# Patient Record
Sex: FEMALE | Race: WHITE
[De-identification: names, ages, dates, MRNs, and addresses within clinical notes are randomized per-mention and may not be internally consistent; named-entity substitution may affect disease eponyms.]

---

## 2018-08-09 ENCOUNTER — HOSPITAL ENCOUNTER (EMERGENCY)
Dept: HOSPITAL 25 - UCCORT | Age: 58
Discharge: HOME | End: 2018-08-09
Payer: COMMERCIAL

## 2018-08-09 VITALS — SYSTOLIC BLOOD PRESSURE: 141 MMHG | DIASTOLIC BLOOD PRESSURE: 78 MMHG

## 2018-08-09 DIAGNOSIS — R51: ICD-10-CM

## 2018-08-09 DIAGNOSIS — H92.01: Primary | ICD-10-CM

## 2018-08-09 DIAGNOSIS — N18.3: ICD-10-CM

## 2018-08-09 DIAGNOSIS — J02.9: ICD-10-CM

## 2018-08-09 PROCEDURE — 99211 OFF/OP EST MAY X REQ PHY/QHP: CPT

## 2018-08-09 PROCEDURE — G0463 HOSPITAL OUTPT CLINIC VISIT: HCPCS

## 2018-08-09 NOTE — XMS REPORT
Desi Cai

 Created on:2018



Patient:Desi Cai

Sex:Female

:1960

External Reference #:[id number]





Demographics







 Address  [address] 



   [place] [address]

 

 Home Phone  [phone]

 

 Mobile Phone  [phone]

 

 Email Address  [email or web address]

 

 Preferred Language  English

 

 Marital Status  Not  Or 

 

 Christian Affiliation  Unknown

 

 Race  White

 

 Ethnic Group  Not  Or 









Author







 Organization  [email or web address]

 

 Address  [address]



   [place] [address]

 

 Phone  [phone]









Support







 Name  Relationship  Address  Phone

 

 Paulino Cai    [phone]   [phone]



     [place] [address]  









Care Team Providers







 Name  Role  Phone

 

 Stacey Charles MD  Care Team Information   Unavailable









Payers







 Type  Date  Identification Numbers  Payment Provider  Subscriber

 

 Health Maintenance  Effective:  Policy Number:  BCBS Ppo  Desi Cai



 [facility]  2018  [id number]    









 PayID: [id number]  [address] [id number]









 [place] [address]







Problems







 Date  Description  Provider  Status

 

 Onset: 2017  Impaired fasting glycaemia  Stacey Charles MD  Active

 

 Onset: 2017  Irritable bowel syndrome  Stacey Charles MD  Active

 

 Onset: 2017  Allergic rhinitis  Stacey Charles MD  Active

 

 Onset: 2017  Mild intermittent asthma  Stacey Charles MD  Active

 

 Onset: 2018  Chronic kidney disease stage 3  Stacey Charles MD  Active

 

 Onset: 2017  Essential hypertension  Stacey Charles MD  Resolved









 Resolved: 2018







Family History







 Date  Family Member(s)  Problem(s)  Comments

 

   Father   due to Cancer,  () - smoker 49



     Lung  

 

 :  (age 92  Mother   due to Natural  



 Years)    Causes  

 

   Children  3  

 

   Siblings  3  

 

   First Brother  1/2 Brother. Valve  



     Replacement. Age 62  

 

   First Sister   due to Cancer,  () - 60



     Kidney  

 

   Second Sister  CAD   (2011) stent



       /upstate age ??55







Social History







 Type  Date  Description  Comments

 

 Marital Status      

 

 Lives With    Spouse  

 

 Occupation    Retired  

 

 ETOH Use    Rarely consumes alcohol  

 

 Smoking    Patient has never smoked  

 

 Exercise Type/Frequency    Exercises regularly  Does housework daily and walks



       5 x/week







Allergies, Adverse Reactions, Alerts







 Date  Description  Reaction  Status  Severity  Comments

 

 2015  NKDA    active    







Medications







 Medication  Date  Status  Form  Strength  Qnty  SIG  Indications  Ordering



                 Provider

 

 Ramipril  /  Active  Capsules  2.5mg  30caps  1 by  N18.3  Jamal2018          mouth    MD Stacey



             every    



             day    

 

 Ventolin HFA  /  Active  Aerosol  108(90Base  1units  2 puffs    Jamal2018      ) mcg/Act    every 4    MD Stacey



             hours as    



             needed    

 

 Flovent HFA  /  Active  Aerosol  44mcg/Act  31.8gm  1 puff  J45.30  
Jamal



             twice a    MD Stacey



             day    

 

 Levocetirizine  /  Active  Tablets  5mg    take 1  J30.9  Loida Charles            tablet    MD Stacey



             by mouth    



             every    



             evening    



             -    



             over-the    



             -counter    

 

 Dicyclomine HCL  /  Active  Tablets  20mg  60tabs  1 by    Jamal



             mouth    MD Stacey



             tid a    



             day as    



             needed    

 

 Loratadine  /  Active  Tablets  10mg  OTC  1 by    Jamal,



             georgi Ibarra MD



             every    



             day prn    

 

 Multivitamins  /  Active  Capsules    OTC  1 by    Jamal



             georgi Ibarra MD



             every    



             day    

 

                 

 

 Amoxicillin/Clavu  /  Hx  Tablets  875-125mg  20tabs  1 by  R10.32  
Jamal



 lanate Potassium   -          georgi Ibarra MD



   /          twice a    



             day x 10    



             days    

 

 Omeprazole  /  Hx  Capsules  40mg  90caps  1 by  R10.12  Bobvann



    -          mouth 2x    a,



   /          every    Dang,



             day for    NP



             1 week    



             then 1x    



             every    



             day    

 

 Escitalopram  /  Hx  Tablets  10mg  30tabs  1 by  F41.9  Jamal



 Oxalate   -          georgi Ibarra MD



   /          every    



             day    

 

 Metoprolol  /  Hx  Tablets  25mg  60tabs  1 by    Jamal



 Tartrate   -          georgi Ibarra MD



   /          twice a    



             day    

 

 Escitalopram  /  Hx  Tablets  10mg  30tabs  1 by  F41.9  Jamal



 Oxalate   -          mouth    MD Stacey



   /          every    



             day    

 

 Zyrtec Allergy  /  Hx  Capsules  10mg  30caps  1 by    Jamal



    -          mouth    MD Stacey



   10/30/          every    



             day    

 

 Flovent HFA  /  Hx  Aerosol  110mcg/Act  12gm  1 puff  J45.30  Jamal



    -          every    MD Stacey



   /          night at    



   2018          bedtime    

 

 Zinc 15  /  Hx  Tablets  66mg    1 by    Jamal



    -          mouth    MD Stacey



   /          every    



             day    

 

 Ventolin HFA  /  Hx  Aerosol  108mcg/Act  1units  2 puffs    Jamal



    -          every     MD Stacey



   /          hours as    



   2018          needed    

 

 Flovent HFA  /  Hx  Aerosol  220mcg/Act  1units  inhale 1  493.90  Jamal



    -          puff by    MD Stacey



   /          mouth at    



             bedtime    

 

 Aspirin Low Dose  /  Hx  Tablets  81mg    1 po qd    Jamal,



    -              MD Stacey



   2017              

 

 Metoprolol  /  Hx    25mg    1 by    Unknown



   0000 -          mouth    



   /          twice a    



             day    







Immunizations







 CPT Code  Status  Date  Vaccine  Reaction  Lot #

 

   Given  2017  Flu Vaccine NOS    

 

   Given  10/05/2016  Fluvirin Immunization  [facility]  

 

   Given  10/09/2015  Fluvirin Immunization  [facility]  

 

 [id number]  Given  10/07/2013  Afluria Or Fluvirin Flu Vac Intramuscular    

 

 74444  Given  10/05/2013  Afluria Or Fluvirin Flu Vac Intramuscular    

 

 [id number]  Given  09/10/2009  Afluria Or Fluvirin Flu Vac Intramuscular    







Vital Signs







 Date  Vital  Result  Comment

 

 2018  Weight  148.00 lb  









 Heart Rate  76 /min  

 

 BP Systolic  122 mmHg  

 

 BP Diastolic  70 mmHg  

 

 Respiratory Rate  18 /min  

 

 Height  64 inches  5'4" 18

 

 BMI (Body Mass Index)  25.4 kg/m2  









 2018  Weight  153.00 lb  









 Heart Rate  76 /min  

 

 BP Systolic  128 mmHg  

 

 BP Diastolic  80 mmHg  

 

 Respiratory Rate  18 /min  

 

 Height  64 inches  5'4" 18

 

 BMI (Body Mass Index)  26.3 kg/m2  









 2017  Weight  148.00 lb  









 Heart Rate  84 /min  

 

 BP Systolic  128 mmHg  

 

 BP Diastolic  86 mmHg  

 

 Respiratory Rate  16 /min  

 

 Height  64 inches  5'4"

 

 BMI (Body Mass Index)  25.4 kg/m2  









 2017  Weight  140.00 lb  









 Heart Rate  124 /min  

 

 BP Systolic  150 mmHg  

 

 BP Diastolic  90 mmHg  

 

 Respiratory Rate  16 /min  

 

 Height  64 inches  5'4" 17

 

 BMI (Body Mass Index)  24.0 kg/m2  









 2017  Weight  142.00 lb  









 Heart Rate  104 /min  

 

 BP Systolic  130 mmHg  

 

 BP Diastolic  88 mmHg  

 

 Respiratory Rate  18 /min  

 

 Height  64 inches  5'4" 17

 

 BMI (Body Mass Index)  24.4 kg/m2  









 2017  Weight  139.00 lb  









 Heart Rate  76 /min  

 

 BP Systolic  150 mmHg  

 

 BP Diastolic  90 mmHg  

 

 Respiratory Rate  18 /min  

 

 Height  64 inches  5'4" 17

 

 BMI (Body Mass Index)  23.9 kg/m2  









 2017  Body Temperature  97.8 F  









 Weight  142.00 lb  

 

 Heart Rate  78 /min  

 

 BP Systolic  150 mmHg  

 

 BP Diastolic  80 mmHg  

 

 BP Systolic Recheck  132 mmHg  

 

 BP Diastolic Recheck  82 mmHg  

 

 Respiratory Rate  18 /min  

 

 Height  64 inches  5'4" 17

 

 BMI (Body Mass Index)  24.4 kg/m2  









 2017  Weight  141.00 lb  









 Heart Rate  78 /min  

 

 BP Systolic  122 mmHg  

 

 BP Diastolic  70 mmHg  

 

 Respiratory Rate  13 /min  

 

 Height  64 inches  5'4" 17

 

 BMI (Body Mass Index)  24.2 kg/m2  









 2016  Weight  154.00 lb  









 Heart Rate  94 /min  

 

 BP Systolic  138 mmHg  L/Reg

 

 BP Diastolic  86 mmHg  L/Reg

 

 Respiratory Rate  17 /min  

 

 Height  64 inches  5'4" 

 

 BMI (Body Mass Index)  26.4 kg/m2  









 2015  Weight  146.00 lb  









 Heart Rate  72 /min  

 

 BP Systolic  120 mmHg  

 

 BP Diastolic  80 mmHg  

 

 Respiratory Rate  18 /min  

 

 Height  64.5 inches  5'4.50"

 

 BMI (Body Mass Index)  24.7 kg/m2  









 2014  Weight  148.00 lb  









 Heart Rate  68 /min  

 

 BP Systolic  130 mmHg  

 

 BP Diastolic  80 mmHg  

 

 Respiratory Rate  18 /min  

 

 Height  64.5 inches  5'4.50"







Results







 Test  Date  Test  Result  H/L  Range  Note

 

 Laboratory test  2018  Pap Smear Thin Prep  SEE NOTE      1



 finding            

 

 Laboratory test  2018  [facility]      2



 finding      <SEE NOTE>      

 

 Lipid  2018  Cholesterol  208 mg/dL  High    3









 Triglycerides  97 mg/dL      3

 

 HDL  68 mg/dL    35-85  3, 4

 

 Chol/ HDL Ratio  3.0 ratio  Low  3.7-5.6  3

 

 VLDL  19 mg/dL    2-29  3

 

 LDL (Calc)  120 mg/dL  High  20-99  3, 5









 Laboratory test finding  2018  TSH  1.64 uIU/mL    0.35-4.94  3

 

 CBC With Auto Diff  2018  WBC  6.8 K/uL    4.1-11.0  3









 RBC  4.73 M/uL    4.00-5.40  3

 

 Hemoglobin  14.0 gm/dL    12.0-16.0  3

 

 Hematocrit  42.0 %    36.0-47.0  3

 

 MCV  88.8 fL    80.0-97.0  3

 

 MCH  29.6 pg    27.0-32.0  3

 

 MCHC  33.4 g/dL    32.0-36.0  3

 

 RDW  13.3 %    11.5-14.5  3

 

 PLT Count  280 K/ul    140-400  3

 

 MPV  7.4 FL    7.1-10.7  3

 

 Neutrophil  44.4 %    35.0-75.0  3

 

 Lymphocyte  35.3 %    16.0-52.0  3

 

 Monocyte  9.4 %    2.0-10.0  3

 

 Eosinophil  9.9 %  High  0.0-5.0  3

 

 Basophil  1.0 %    0.0-4.0  3

 

 Abs Neutrophils  3.0 K/uL    2.1-8.0  3

 

 Abs Lymphocytes  2.4 K/uL    0.8-5.5  3

 

 Abs Monocytes  0.6 K/uL    0.1-1.0  3

 

 Abs Eosinophils  0.7 K/uL  High  0.0-0.5  3

 

 Abs Basophils  0.1 K/uL    0.0-0.3  3









 Comprehensive Met Panel-FCM  2018  Sodium  142 mmol/L    135-146  3, 6









 Potassium  4.4 mmol/L    3.5-5.2  3

 

 Chloride#  106 mmol/L      3, 7

 

 Carbon Dioxide  28 mmol/L    24-34  3

 

 Glucose  110 mg/dL  High    3

 

 BUN  20 mg/dL    6-26  3

 

 Creatinine  1.0 mg/dL    0.5-1.4  3

 

 Calcium  9.3 mg/dL    8.5-10.2  3

 

 Total Protein  6.7 g/dL    6.0-8.0  3

 

 Albumin  4.1 g/dL    3.6-4.9  3

 

 Globulin  2.6 g/dL    2.0-3.5  3

 

 A/G Ratio  1.6 Ratio    1.0-2.2  3

 

 Total Bilirubin  0.6 mg/dL    0.1-1.3  3

 

 Alkaline Phosphatase  71 U/L      3

 

 Alt  27 U/L    3-42  3

 

 Ast  23 U/L    8-42  3

 

  Rowena Egfr  >60    >60  3, 8

 

 Non  Rowena Egfr  56  Low  >60  3, 9

 

 Anion Gap  8 mmol/L    5-15  3, 10









 Hemoglobin A1c  2018  Hemoglobin A1c  5.7 %    4.1-5.9  3









 Estimated Average Glucose Calc  117 mg/dL      3









 CBC With Auto Diff  2017  WBC  6.1 K/uL    4.1-11.0  11









 RBC  4.55 M/uL    4.00-5.40  11

 

 Hemoglobin  13.6 gm/dL    12.0-16.0  11

 

 Hematocrit  40.7 %    36.0-47.0  11

 

 MCV  89.5 fL    80.0-97.0  11

 

 MCH  29.8 pg    27.0-32.0  11

 

 MCHC  33.3 g/dL    32.0-36.0  11

 

 RDW  13.7 %    11.5-14.5  11

 

 PLT Count  256 K/ul    140-400  11

 

 Neutrophil  59.2 %    35.0-75.0  11

 

 Lymphocyte  26.0 %    16.0-52.0  11

 

 Monocyte  8.8 %    2.0-10.0  11

 

 Eosinophil  5.1 %  High  0.0-5.0  11

 

 Basophil  0.9 %    0.0-4.0  11

 

 Abs Neutrophils  3.6 K/uL    2.1-8.0  11

 

 Abs Lymphocytes  1.6 K/uL    0.8-5.5  11

 

 Abs Monocytes  0.5 K/uL    0.1-1.0  11

 

 Abs Eosinophils  0.3 K/uL    0.0-0.5  11

 

 Abs Basophils  0.1 K/uL    0.0-0.3  11









 Laboratory test finding  2017  TSH  0.80 uIU/mL    0.35-4.94  11

 

 Hemoglobin A1c  2017  Hemoglobin A1c  5.7 %    4.1-5.9  11









 Estimated Average Glucose Calc  117      11









 Basic (BMP)  2017  Sodium  143 mmol/L    135-146  11, 12









 Potassium  4.0 mmol/L    3.5-5.2  11

 

 Chloride#  106 mmol/L      11, 13

 

 Carbon Dioxide  28 mmol/L    24-34  11

 

 Glucose  100 mg/dL      11

 

 BUN  18 mg/dL    6-26  11

 

 Creatinine  0.9 mg/dL    0.5-1.4  11

 

 Calcium  9.6 mg/dL    8.5-10.2  11

 

 Non  Rowena Egfr  >60    >60  11, 14

 

  Rowena Egfr  >60    >60  11, 15

 

 Anion Gap  13 mmol/L    7-16  11, 16









 Urinalysis With Microscopic  2017  Urine Color  YELLOW    Yellow  17









 Urine Clarity  CLEAR    Clear  17

 

 Urine Glucose - Dipstick  NEGATIVE mg/dL    Negative  17

 

 Urine Bilirubin - Dipstick  NEGATIVE    Negative  17

 

 Urine Ketone  15 mg/dL  High  Negative  17

 

 Urine Specific Gravity  1.015    1.010-1.030  17

 

 Urine Blood  MODERATE    Negative  17

 

 Urine PH  5.0  Low  6.5-7.5  17

 

 Urine Protein - Dipstick  NEGATIVE mg/dL    Negative  17

 

 Urine Urobilinogen - Dipstick  0.2 E.U./dL    0.2-1.0  17

 

 Urine Nitrite - Dipstick  NEGATIVE    Negative  17

 

 Urine Leuk Esterase  NEGATIVE    Negative  17

 

 Urine RBC  0-2 rbc/hpf    0-2  17

 

 Urine WBC  0-2 wbc/hpf    0-7  17

 

 Urine Epithelial Cells  VERY FEW /lpf    None Seen  17

 

 Source:  URINE, CLEAN CAT <SEE NOTE>      17, 18









 Comprehensive Metabolic Panel  2017  Glucose  103 mg/dL      17









 BUN  18 mg/dL    7-18  17

 

 Creatinine  1.1 mg/dL    0.6-1.3  17

 

 Glom Filtration Rate, Estimate  54 mL/min    >60  17

 

 If   >60 mL/min    >60  17, 19

 

 BUN/Creat  16.3 ratio      17

 

 Sodium  139 mmol/L    136-145  17

 

 Potassium  3.7 mmol/L    3.5-5.1  17

 

 Chloride  103 mmol/L      17

 

 Carbon Dioxide  25 mmol/L    21-32  17

 

 Anion Gap  11 mEq/L    8-16  17

 

 Calcium  9.8 mg/dL    8.5-10.1  17

 

 Total Protein  8.3 g/dL  High  6.4-8.2  17

 

 Albumin  4.3 g/dL    3.4-5.0  17

 

 Globulin  4.0 g/dL    1.9-4.3  17

 

 Alb/Glob  1.1 ratio      17

 

 Bilirubin,Total  0.5 mg/dL    0.2-1.0  17

 

 Sgot/Ast  18 U/L    15-37  17

 

 SGPT/Alt  30 U/L    12-78  17

 

 Alkaline Phosphatase  84 U/L      17









 Laboratory test finding  2017  Lipase  318 U/L      17

 

 CBS W/Automated Diff  2017  White Blood Count  11.4 K/uL  High  3.1-10.7
  17









 Red Blood Count  4.96 M/uL    3.90-5.40  17

 

 Hemoglobin  14.9 gm/dL    11.6-15.8  17

 

 Hematocrit  44.1 %    36.0-46.1  17

 

 Mean Cell Volume  88.9 fl    80.9-99.0  17

 

 Mean Corpuscular HGB  30.0 pg    25.9-32.7  17

 

 Mean Corpuscular HGB Conc  33.8 g/dL    30.8-34.3  17

 

 Platelet Count  272 K/uL    150-400  17

 

 Red Cell Distri Width SD  43.0 fl    3-47  17

 

 Red Cell Distri Width %CV  13.6 %    11.7-14.4  17

 

 Mean Platelet Volume  9.3 fL    8.9-12.4  17

 

 Neut%  63.6 %    40.4-72.8  17

 

 Lymph %  26.5 %    20.0-42.0  17

 

 Mono %  7.4 %    4.3-13.2  17

 

 Eo%  2.2 %    0.0-6.6  17

 

 Bas%  0.3 %    0.0-1.1  17

 

 Neut#  7.26 K/uL  High  1.8-7.0  17

 

 Lymph #  3.03 K/uL    1.0-4.0  17

 

 Mono #  0.85 K/uL    0.3-0.9  17

 

 Eos #  0.25 K/uL    0.0-0.5  17

 

 Baso #  0.04 K/uL    0.0-0.1  17









 Slide Review  2017  Slide Review  .      17, 20

 

 CBC With Auto Diff  2017  WBC  9.1 K/uL    4.1-11.0  21









 RBC  4.94 M/uL    4.00-5.40  21

 

 Hemoglobin  14.6 gm/dL    12.0-16.0  21

 

 Hematocrit  44.0 %    36.0-47.0  21

 

 MCV  89.1 fL    80.0-97.0  21

 

 MCH  29.6 pg    27.0-32.0  21

 

 MCHC  33.2 g/dL    32.0-36.0  21

 

 RDW  13.3 %    11.5-14.5  21

 

 PLT Count  289 K/ul    140-400  21

 

 Neutrophil  73.8 %    35.0-75.0  21

 

 Lymphocyte  16.7 %    16.0-52.0  21

 

 Monocyte  7.0 %    2.0-10.0  21

 

 Eosinophil  1.9 %    0.0-5.0  21

 

 Basophil  0.6 %    0.0-4.0  21

 

 Abs Neutrophils  6.8 K/uL    2.1-8.0  21

 

 Abs Lymphocytes  1.5 K/uL    0.8-5.5  21

 

 Abs Monocytes  0.6 K/uL    0.1-1.0  21

 

 Abs Eosinophils  0.2 K/uL    0.0-0.5  21

 

 Abs Basophils  0.1 K/uL    0.0-0.3  21









 Laboratory test finding  2017  Esr  19 mm/hr    0-20  21

 

 Hepatic Panel (LFT)  2017  Total Protein  7.4 g/dL    6.0-8.0  21









 Albumin  4.7 g/dL    3.6-4.9  21

 

 Total Bilirubin  0.9 mg/dL    0.1-1.3  21

 

 Direct Bilirubin  0.1 mg/dL    0.0-0.4  21

 

 Alkaline Phosphatase  75 U/L      21

 

 Alt  23 U/L    3-42  21

 

 Ast  21 U/L    8-42  21









 Laboratory test finding  2017  Lipase  34 U/L    11-82  21

 

 Basic (BMP)  2017  Sodium  140 mmol/L    135-146  21, 22









 Potassium  4.0 mmol/L    3.5-5.2  21

 

 Chloride#  103 mmol/L      21, 23

 

 Carbon Dioxide  26 mmol/L    24-34  21

 

 Glucose  133 mg/dL  High    21

 

 BUN  15 mg/dL    6-26  21

 

 Creatinine  1.0 mg/dL    0.5-1.4  21

 

 Calcium  9.9 mg/dL    8.5-10.2  21

 

 Non  Rowena Egfr  57  Low  >60  21, 24

 

  Rowena Egfr  >60    >60  21, 25

 

 Anion Gap  15 mmol/L    7-16  21, 26









 Laboratory test finding  2017  TSH  0.69 uIU/mL    0.35-4.94  21

 

 Rout Urine W/ Micro -RL  2017  Color  YELLOW      27









 Appearance  CLEAR      27

 

 Spec Grav Urine  1.030    (1.003-1.030)  27

 

 PH Urine  6.0    (5.0-7.5)  27

 

 Leuk Esterase  NEGATIVE    (Neg)  27

 

 Nitrite Urine  NEGATIVE    (Neg)  27

 

 Protein Urine  NEGATIVE    (Neg)  27

 

 Glucose Urine  NEGATIVE    (Neg)  27

 

 Ketone Urine  NEGATIVE    (Neg)  27

 

 Urobilinogen  0.2 mg/dL    (0-1.0)  27

 

 Bilirubin Urine  NEGATIVE    (Neg)  27

 

 Blood/HGB Urine  TRACE    (Neg)  27

 

 Urine WBC  0-2 [HPF]    (0-5)  27

 

 Urine RBC  * 3-5 [HPF]    (0-2)  27

 

 Bacteria  1+ [HPF]      27, 28









 Laboratory test  2017  Urine Culture  Microbiology res <SEE      27, 29



 finding      NOTE>      

 

 Hemoglobin A1c  2017  Hemoglobin A1c  5.8 %    4.1-5.9  30









 Estimated Average Glucose Calc  120      30









 Basic (BMP)  2017  Sodium  141 mmol/L    135-146  30, 31









 Potassium  4.2 mmol/L    3.5-5.2  30

 

 Chloride#  104 mmol/L      30, 32

 

 Carbon Dioxide  28 mmol/L    24-34  30

 

 Glucose  108 mg/dL  High    30

 

 BUN  15 mg/dL    6-26  30

 

 Creatinine  1.0 mg/dL    0.5-1.4  30

 

 Calcium  9.6 mg/dL    8.5-10.2  30

 

 Non  Rowena Egfr  55  Low  >60  30, 33

 

  Rowena Egfr  >60    >60  30, 34

 

 Anion Gap  13 mmol/L    7-16  30, 35









 Laboratory test finding  2017  H. Pylori Stool Ag  SEE NOTE      36, 37

 

 Ua RFX Micro & Culture II  2017  Urine Color  YELLOW    Yellow  38









 Urine Clarity  CLEAR    Clear  38

 

 Urine Glucose - Dipstick  NEGATIVE mg/dL    Negative  38

 

 Urine Bilirubin - Dipstick  NEGATIVE    Negative  38

 

 Urine Ketone  NEGATIVE mg/dL    Negative  38

 

 Urine Specific Gravity  <=1.005  Low  1.010-1.030  38

 

 Urine Blood  TRACE    Negative  38

 

 Urine PH  5.5  Low  6.5-7.5  38

 

 Urine Protein - Dipstick  NEGATIVE mg/dL    Negative  38

 

 Urine Urobilinogen - Dipstick  0.2 E.U./dL    0.2-1.0  38

 

 Urine Nitrite - Dipstick  NEGATIVE    Negative  38

 

 Urine Leuk Esterase  NEGATIVE    Negative  38

 

 Source:  URINE, CLEAN CAT <SEE NOTE>      38, 39









 CBC With Auto Diff  2016  WBC  5.2 K/uL    4.1-11.0  









 RBC  4.70 M/uL    4.00-5.40  

 

 Hemoglobin  14.1 gm/dL    12.0-16.0  

 

 Hematocrit  41.7 %    36.0-47.0  

 

 MCV  88.7 fL    80.0-97.0  

 

 MCH  30.1 pg    27.0-32.0  

 

 MCHC  33.9 g/dL    32.0-36.0  

 

 RDW  13.2 %    11.5-14.5  

 

 PLT Count  241 K/ul    140-400  

 

 Neutrophil  58.8 %    35.0-75.0  

 

 Lymphocyte  25.2 %    16.0-52.0  

 

 Monocyte  8.1 %    2.0-10.0  

 

 Eosinophil  7.1 %  High  0.0-5.0  

 

 Basophil  0.8 %    0.0-4.0  

 

 Abs Neutrophils  3.0 K/uL    2.1-8.0  

 

 Abs Lymphocytes  1.3 K/uL    0.8-5.5  

 

 Abs Monocytes  0.4 K/uL    0.1-1.0  

 

 Abs Eosinophils  0.4 K/uL    0.0-0.5  

 

 Abs Basophils  0.0 K/uL    0.0-0.3  









 Comprehensive Metabolic (CMP)  2016  Sodium  139 mmol/L    134-142  









 Potassium  4.4 mmol/L    3.5-5.2  

 

 Chloride  104 mmol/L      

 

 Carbon Dioxide  28 mmol/L    24-34  

 

 Glucose  118 mg/dL  High    

 

 BUN  20 mg/dL    6-26  

 

 Creatinine  1.0 mg/dL    0.5-1.4  

 

 Calcium  9.8 mg/dL    8.5-10.2  

 

 Total Protein  7.2 g/dL    6.0-8.0  

 

 Albumin  4.3 g/dL    3.6-4.9  

 

 Globulin  2.9 g/dL    2.0-3.5  

 

 A/G Ratio  1.5 Ratio    1.0-2.2  

 

 Total Bilirubin  0.5 mg/dL    0.1-1.3  

 

 Alkaline Phosphatase  69 U/L      

 

 Alt  24 U/L    3-42  

 

 Ast  24 U/L    8-42  

 

 Anion Gap  11 mmol/L    6-14  

 

 African Rowena Egfr  >60    >60  40

 

 Non  Rowena Egfr  58  Low  >60  41









 Laboratory test finding  2016  TSH  1.27 uIU/mL    0.35-4.94  

 

 Lipid  2016  Cholesterol  208 mg/dL  High    









 Triglycerides  87 mg/dL      

 

 HDL  70 mg/dL    35-85  42

 

 Chol/ HDL Ratio  3.0 ratio  Low  3.7-5.6  

 

 VLDL  17 mg/dL    2-29  

 

 LDL (Calc)  121 mg/dL  High  20-99  43









 Laboratory test finding  2016  Urine Culture  Microbiology res <SEE NOTE
>      44

 

 Rout Urine W/ Micro -RL  2016  Color  YELLOW      









 Appearance  CLEAR      

 

 Spec Grav Urine  1.004    (1.003-1.030)  

 

 PH Urine  6.5    (5.0-7.5)  

 

 Leuk Esterase  NEGATIVE    (Neg)  

 

 Nitrite Urine  NEGATIVE    (Neg)  

 

 Protein Urine  NEGATIVE    (Neg)  

 

 Glucose Urine  NEGATIVE    (Neg)  

 

 Ketone Urine  NEGATIVE    (Neg)  

 

 Urobilinogen  0.2 mg/dL    (0-1.0)  

 

 Bilirubin Urine  NEGATIVE    (Neg)  

 

 Blood/HGB Urine  TRACE    (Neg)  

 

 Epithelial Cells  NEGATIVE [HPF]    (Neg)  

 

 Hyaline Casts  0.0 [LPF]    (0-5)  

 

 Bacteria  NEGATIVE [HPF]    (Neg)  

 

 Urine WBC  0.1 [HPF]    (0-8)  

 

 Urine RBC  1.6 [HPF]    (0-3)  45









 Laboratory test  2015  Urine Culture  Microbiology res <SEE      46



 finding      NOTE>      

 

 Laboratory test  2015  TSH  0.65 uIU/mL    0.35-4.94  



 finding            









 Hepatitis C Virus Antibody  NONREACTIVE    Nonreactive  









 Comprehensive Metabolic (CMP)  2015  Sodium  140 mmol/L    134-142  









 Potassium  4.6 mmol/L    3.5-5.2  

 

 Chloride  104 mmol/L      

 

 Carbon Dioxide  28 mmol/L    24-34  

 

 Glucose  114 mg/dL  High    

 

 BUN  15 mg/dL    6-26  

 

 Creatinine  0.9 mg/dL    0.5-1.4  

 

 Calcium  9.5 mg/dL    8.5-10.2  

 

 Total Protein  6.9 g/dL    6.0-8.0  

 

 Albumin  4.4 g/dL    3.6-4.9  

 

 Globulin  2.5 g/dL    2.0-3.5  

 

 A/G Ratio  1.8 Ratio    1.0-2.2  

 

 Total Bilirubin  0.5 mg/dL    0.1-1.3  

 

 Alkaline Phosphatase  63 U/L      

 

 Alt  18 U/L    3-42  

 

 Ast  21 U/L    8-42  

 

 Anion Gap  13 mmol/L    6-14  

 

 African Rowena Egfr  >60    >60  47

 

 Non  Rowena Egfr  >60    >60  48









 CBC With Auto Diff  2015  WBC  4.4 K/uL    4.1-11.0  









 RBC  4.80 M/uL    4.00-5.40  

 

 Hemoglobin  14.6 gm/dL    12.0-16.0  

 

 Hematocrit  43.6 %    36.0-47.0  

 

 MCV  90.9 fL    80.0-97.0  

 

 MCH  30.5 pg    27.0-32.0  

 

 MCHC  33.6 g/dL    32.0-36.0  

 

 RDW  13.1 %    11.5-14.5  

 

 PLT Count  209 K/ul    140-400  

 

 Neutrophil  53.6 %    35.0-75.0  

 

 Lymphocyte  29.9 %    16.0-52.0  

 

 Monocyte  8.2 %    2.0-10.0  

 

 Eosinophil  7.6 %  High  0.0-5.0  

 

 Basophil  0.7 %    0.0-4.0  

 

 Abs Neutrophils  2.4 K/uL    2.1-8.0  

 

 Abs Lymphocytes  1.3 K/uL    0.8-5.5  

 

 Abmon  0.4 K/uL    0.1-1.0  

 

 Abs Eosinophils  0.3 K/uL    0.0-0.5  

 

 Abs Basophils  0.0 K/uL    0.0-0.3  









 Lipid  2015  Cholesterol  177 mg/dL      









 Triglycerides  90 mg/dL      

 

 HDL  67 mg/dL    35-85  49

 

 Chol/ HDL Ratio  2.6 ratio  Low  3.7-5.6  

 

 VLDL  18 mg/dL    2-29  

 

 LDL (Calc)  92 mg/dL    20-99  50









 Laboratory test finding  2014  [id number]  See Note      51









 Urine Amorph Sediment  Very Few    Negative  

 

 Urine Bacteria  Very Few None Seen      

 

 Urine Bilirubin - Dipstick  Negative    Negative  

 

 Urine Blood  Small  High  Negative  

 

 Urine Clarity  SL Cloudy    Clear  

 

 Urine Color  Yellow    Yellow  

 

 Urine Epithelial Cells  Very Few None Seen /lpf      

 

 Urine Glucose - Dipstick  Negative mg/dL    Negative  

 

 Urine Ketone  Negative mg/dL    Negative  

 

 Urine Leuk Esterase  Negative    Negative  

 

 Urine Nitrite - Dipstick  Negative    Negative  

 

 Urine PH  5.5  Low  6.5-7.5  

 

 Urine Protein - Dipstick  Negative mg/dL    Negative  

 

 Urine RBC  0-2 rbc/hpf    0-7  

 

 Urine Specific Gravity  >=1.030    1.010-1.030  

 

 Urine Uric Acid Crystals  Few None Seen      

 

 Urine Urobilinogen - Dipstick  0.2 E.U./dL    0.2-1.0  

 

 Urine WBC  0-2 wbc/hpf    0-7  









 Laboratory test finding  2014  Cytology  See Note      52









 1  [employer] [facility]



   [address] [place] [address]



   Tel: [phone]  Fax:  [phone]



   



   CYTOLOGY REPORT



   



   Accession #: [id number]



   



   Source of Specimen(s): Thin Prep Cervical / Endocervical Pap Smear -



   One



   Vial



   



   Date of Last Menstrual Period:  age 51



   Menstrual History:  Post-menopausal



   Other Clinical Conditions:  Last Pap Smear:  normal



   HPV ASSAY REQUESTED



   



   



   



   Specimen Adequacy



   SATISFACTORY FOR EVALUATION



   PRESENCE OF ENDOCERVICAL/TRANSFORMATION ZONE COMPONENT



   



   General Categorization



   NEGATIVE FOR INTRAEPITHELIAL LESION OR MALIGNANCY



   



   Interpretation



   NEGATIVE FOR INTRAEPITHELIAL LESION OR MALIGNANCY



   



   Comment



   HPV testing will be performed and a separate report will be issued.



   



   



   



   



   Reported: 7/10/2018 14:59



   ***Electronically Signed Out By Shruti SINGH(ASCP)***



   



   dan



   



   ICD9 Code:



   Z01.419



   CPT code:



   A: SX980S



   Unless otherwise specified, testing performed by "[facility] [place] [address]

 

 2  



   [place] [address]



   Tel# [phone]  Fax# [phone]



   



   Amplified Molecular High Risk HPV Test



   



   Patient Name:DESI CAI



   Patient :1960



   Ordering Physician:STACEY CHARLES MD



   



   Accession Number [id number]



   Specimen(s) Received



    A: High Risk HPV Thin Prep Cervical / Endocervical Pap Smear - One Vial



   



    Other Case Numbers: [id number]



   



   



   Diagnosis



   



   



   RISK GROUPS                   RESULTS



   



   High Risk                    NEGATIVE



   



   Tested for HPV Types (16, 18, 31, 33, 35, 39, 45, 51, 52, 56, 58, 59, 66,



   68)



   



   



   Reported: 2018 07:49



   **Electronically Signed Out By Gwendolyn Mcnulty**



   brent Smith

 

 3  1 year Fastin hours 1 year Fastin hours Fastin hours Fastin 
hours



   1 year Fastin hours 1 year Fastin hours

 

 4  Per NCEP ATP III Guidelines:



   Results lower than 40 mg/dL are suggestive of increased risk for



   coronary artery disease.  Results > or=to 60 mg/dL are considered a



   negative risk factor.

 

 5  Per NCEP ATP III Guidelines:



   Normal Population <130



   Patients with medical conditions: CHD/DM



   Optimal: <100



   Borderline high: 130-159



   High: 160-189



   Very high: >189

 

 6  Updated reference range on new analyzer 3-2017

 

 7  Updated reference range on new analyzer 3-2017

 

 8  Concerning GFR Guidelines for  Americans:



   Normal function or mild renal disease, if clinically at risk:  >/=60



   mL/min



   Moderately decreased:  30-59



   Severely decreased:  15-29



   Renal failure:  <15

 

 9  Concerning GFR Guidelines:



   Normal function or mild renal disease, if clinically at risk:  >/=60



   mL/min



   Moderately decreased:  30-59



   Severely decreased:  15-29



   Renal failure:  <15



   



   Glomerular Filtration Rate (GFR) is estimated based on the MDRD



   equation, which assumes a steady state for creatinine as recommended



   by the National Kidney Disease Education Program in conjunction with



   the National Institutes of Health and the National Kidney Foundation.



   



   Clinical conditions in which it may be necessary to measure GFR by



   using clearance methods include extremes of age and body size, severe



   malnutrition or obesity, diseases of skeletal muscle, paraplegia or



   quadriplegia, vegetarian diet, rapidly changing kidney function, and



   calculation of the dose of potentially toxic drugs that are excreted



   by the kidneys.

 

 10  Updated Reference Range 

 

 11  prior to 

 

 12  Updated reference range on new analyzer 3-2017

 

 13  Updated reference range on new analyzer 3-2017

 

 14  Concerning GFR Guidelines:



   Normal function or mild renal disease, if clinically at risk:  >/=60



   mL/min



   Moderately decreased:  30-59



   Severely decreased:  15-29



   Renal failure:  <15



   



   Glomerular Filtration Rate (GFR) is estimated based on the MDRD



   equation, which assumes a steady state for creatinine as recommended



   by the National Kidney Disease Education Program in conjunction with



   the National Institutes of Health and the National Kidney Foundation.



   



   Clinical conditions in which it may be necessary to measure GFR by



   using clearance methods include extremes of age and body size, severe



   malnutrition or obesity, diseases of skeletal muscle, paraplegia or



   quadriplegia, vegetarian diet, rapidly changing kidney function, and



   calculation of the dose of potentially toxic drugs that are excreted



   by the kidneys.

 

 15  Concerning GFR Guidelines for  Americans:



   Normal function or mild renal disease, if clinically at risk:  >/=60



   mL/min



   Moderately decreased:  30-59



   Severely decreased:  15-29



   Renal failure:  <15

 

 16  Updated reference range on new analyzer 3-2017

 

 17  PAIN IN LEFT SIDE,HAS HAD FEW DAYS

 

 18  URINE, CLEAN CATCH

 

 19  Note:



   Persistent reduction for 3 months or more in an eGFR <60



   mL/min/1.73 m2 defines CKD.  Patients with eGFR values >/=60



   mL/min/1.73 m2 may also have CKD if evidence of persistent



   proteinuria is present.



   



   The original MDRD equation for estimated GFR is not valid



   for patients less than 18 years of age.



   



   Additional information may be found at www.kdoqi.org.

 

 20  Instrument flagged sample for slide review.



   Less than 10% Bands seen, few atypical lymphocytes seen.



   RBC morphology essentially normal. Platelet



   estimate=normal

 

 21  today Fastin hours today Fastin hours Fastin hours Fastin 
hours



   today Fastin hours Fastin hours Fastin hours today Fastin 
hours

 

 22  Updated reference range on new analyzer 3-2017

 

 23  Updated reference range on new analyzer 3-2017

 

 24  Concerning GFR Guidelines:



   Normal function or mild renal disease, if clinically at risk:  >/=60



   mL/min



   Moderately decreased:  30-59



   Severely decreased:  15-29



   Renal failure:  <15



   



   Glomerular Filtration Rate (GFR) is estimated based on the MDRD



   equation, which assumes a steady state for creatinine as recommended



   by the National Kidney Disease Education Program in conjunction with



   the National Institutes of Health and the National Kidney Foundation.



   



   Clinical conditions in which it may be necessary to measure GFR by



   using clearance methods include extremes of age and body size, severe



   malnutrition or obesity, diseases of skeletal muscle, paraplegia or



   quadriplegia, vegetarian diet, rapidly changing kidney function, and



   calculation of the dose of potentially toxic drugs that are excreted



   by the kidneys.

 

 25  Concerning GFR Guidelines for  Americans:



   Normal function or mild renal disease, if clinically at risk:  >/=60



   mL/min



   Moderately decreased:  30-59



   Severely decreased:  15-29



   Renal failure:  <15

 

 26  Updated reference range on new analyzer 3-2017

 

 27  Fastin hours

 

 28  Unless otherwise specified, testing performed by [facility]



   [facility]  [address] [place] [address]  [id number]

 

 29  Microbiology results



   SOURCE



   Clean Catch Midstream



   FINAL RESULT



   No growth

 

 30  soon

 

 31  Updated reference range on new analyzer 3-2017

 

 32  Updated reference range on new analyzer 3-2017

 

 33  Concerning GFR Guidelines:



   Normal function or mild renal disease, if clinically at risk:  >/=60



   mL/min



   Moderately decreased:  30-59



   Severely decreased:  15-29



   Renal failure:  <15



   



   Glomerular Filtration Rate (GFR) is estimated based on the MDRD



   equation, which assumes a steady state for creatinine as recommended



   by the National Kidney Disease Education Program in conjunction with



   the National Institutes of Health and the National Kidney Foundation.



   



   Clinical conditions in which it may be necessary to measure GFR by



   using clearance methods include extremes of age and body size, severe



   malnutrition or obesity, diseases of skeletal muscle, paraplegia or



   quadriplegia, vegetarian diet, rapidly changing kidney function, and



   calculation of the dose of potentially toxic drugs that are excreted



   by the kidneys.

 

 34  Concerning GFR Guidelines for  Americans:



   Normal function or mild renal disease, if clinically at risk:  >/=60



   mL/min



   Moderately decreased:  30-59



   Severely decreased:  15-29



   Renal failure:  <15

 

 35  Updated reference range on new analyzer 3-2017

 

 36  gave pt stool sample kit. 17- zs soon

 

 37  SPECIMEN DESCRIPTION        STOOL



   RESULT                      NEGATIVE FOR H. PYLORI ANTIGEN BY EIA



   REPORT STATUS               FINAL 2017



   Unless otherwise specified, testing performed by [facility]



   [facility]  [address] [place] [address]  [address]

 

 38  HEART BEATING FAST X2/3 DAYS, DONT FEEL GOOD

 

 39  URINE, CLEAN CATCH

 

 40  Concerning GFR Guidelines for  Americans:



   Normal function or mild renal disease, if clinically at risk:  >/=60



   mL/min



   Moderately decreased:  30-59



   Severely decreased:  15-29



   Renal failure:  <15

 

 41  Concerning GFR Guidelines:



   Normal function or mild renal disease, if clinically at risk:  >/=60



   mL/min



   Moderately decreased:  30-59



   Severely decreased:  15-29



   Renal failure:  <15



   



   Glomerular Filtration Rate (GFR) is estimated based on the MDRD



   equation, which assumes a steady state for creatinine as recommended



   by the National Kidney Disease Education Program in conjunction with



   the National Institutes of Health and the National Kidney Foundation.



   



   Clinical conditions in which it may be necessary to measure GFR by



   using clearance methods include extremes of age and body size, severe



   malnutrition or obesity, diseases of skeletal muscle, paraplegia or



   quadriplegia, vegetarian diet, rapidly changing kidney function, and



   calculation of the dose of potentially toxic drugs that are excreted



   by the kidneys.

 

 42  Per NCEP ATP III Guidelines:



   Results lower than 40 mg/dL are suggestive of increased risk for



   coronary artery disease.  Results > or=to 60 mg/dL are considered a



   negative risk factor.

 

 43  Per NCEP ATP III Guidelines:



   Normal Population <130



   Patients with medical conditions: CHD/DM



   Optimal: <100



   Borderline high: 130-159



   High: 160-189



   Very high: >189

 

 44  Microbiology results



   SOURCE



   MIDU



   FINAL RESULT



   No growth

 

 45  Unless otherwise specified, testing performed by [facility]



   [facility]  [address] [place] [address]  [address]

 

 46  Microbiology results



   SOURCE



   MIDU



   FINAL RESULT



   No growth

 

 47  Concerning GFR Guidelines for  Americans:



   Normal function or mild renal disease, if clinically at risk:  >/=60



   mL/min



   Moderately decreased:  30-59



   Severely decreased:  15-29



   Renal failure:  <15

 

 48  Concerning GFR Guidelines:



   Normal function or mild renal disease, if clinically at risk:  >/=60



   mL/min



   Moderately decreased:  30-59



   Severely decreased:  15-29



   Renal failure:  <15



   



   Glomerular Filtration Rate (GFR) is estimated based on the MDRD



   equation, which assumes a steady state for creatinine as recommended



   by the National Kidney Disease Education Program in conjunction with



   the National Institutes of Health and the National Kidney Foundation.



   



   Clinical conditions in which it may be necessary to measure GFR by



   using clearance methods include extremes of age and body size, severe



   malnutrition or obesity, diseases of skeletal muscle, paraplegia or



   quadriplegia, vegetarian diet, rapidly changing kidney function, and



   calculation of the dose of potentially toxic drugs that are excreted



   by the kidneys.

 

 49  Per NCEP ATP III Guidelines:



   Results lower than 40 mg/dL are suggestive of increased risk for



   coronary artery disease.  Results > or=to 60 mg/dL are considered a



   negative risk factor.

 

 50  Per NCEP ATP III Guidelines:



   Normal Population <130



   Patients with medical conditions: CHD/DM



   Optimal: <100



   Borderline high: 130-159



   High: 160-189



   Very high: >189

 

 51  [facility] [address] [place] [address], [address] Phone [phone] 
[place] [address] Fax [phone] CYTOLOGY REPORT Name: Desi Cai Accession #:



   [id number] : 1960 (Age: 54) Sex: F Location: [facility] 
Med.



   Rec. # [id number] Date Collected: 2014 Billing #: [id number] Date 
Received:



   2014 Requisition # [id number] Physician(s): STACEY CHARLES MD Source of 
Specimen:



   ENDOCERVICAL/ECTOCERVICAL THIN PREP Clinical Information: Date of Last 
Menstrual



   Period:  Menstrual History: Post menopausal Treatment History: 
Cryosurgery:



   Years ago Interpretation: NEGATIVE FOR INTRAEPITHELIAL LESION OR MALIGNANCY.



   Specimen Adequacy: SATISFACTORY FOR EVALUATION. Additional Findings:



   ENDOCERVICAL/TRANSFORMATION ZONE PRESENT. dcl ***Electronic Signature*** 
FRANCISCO Miller (ASCP) Reported: 2014 [facility] 
HPV High



   Risk Date Ordered: 2014 Status: Signed Out Date Reported: 2014 
High Risk



   NEGATIVE (HPV types 16, 18, 31, 33, 35, 39, 45, 51, 52, 56, 58, 59, 66, 68) 
APTIMA



   ***Electronic Signature*** Valery Black [place] [facility]



   ICD-9 Code(s) V72.31

 

 52  [facility] [address] [place] [address], [address] Phone [phone] Fax 
[phone] [place] [address] CYTOLOGY REPORT Name: Desi Cai Accession #:



    : 1960 (Age: 54) Sex:F Location: [facility] Med. 
Rec.



   # [id number] Date Collected: 2014 Billing #: [phone] Date Received: 



   Requisition #: [id number] Physician(s): STACEY CHARLES MD Source of Specimen: 
URINE



   Clinical Information Microscopic hematuria. Sister had kidney cancer. V72.31,



   599.70 Gross Description Received # cc. of # fluid. Final Diagnosis NO 
MALIGNANT



   CELLS IDENTIFIED. RARE RED BLOOD CELLS. md ***Electronic Signature*** 
Nino Russo MD Reported: 2014 Also seen by: Elaine Ragsdale, CT (ASCP) [facility] ICD-9 Code(s)







Procedures







 Date  CPT Code  Description  Status  Comment

 

 2018  [address]  Brief Emotional/Behav  Completed  



     Assessment W/ Scoring Doc Per    



     Standard Inst    

 

 2017    Mammogram  Completed  

 

 2017  [address]641  Echography Pelvic Complete  Completed  

 

 2017  [address]  Echography Abdominal Limited  Completed  

 

 2017    Colonoscopy  Completed  was told this is normal -



         repeat in 10 years



         Document: 17 -



         Endoscopy

 

 2015    Mammogram  Completed  







Encounters







 Type  Date  Location  Provider  CPT E/M  Dx

 

 Office Visit  2018  8:00a  [facility]  Stacey Charles MD  [address]  Z01.419









 Z13.89

 

 R73.01

 

 K58.9

 

 J30.9

 

 J45.20

 

 Z12.31

 

 R92.2

 

 N18.3

 

 Z71.9

 

 Z68.26









 Office Visit  2017  9:00a  [facility]  Stacey Charles [place]  [address]  Z01.419









 R73.01

 

 K58.9

 

 I10

 

 R31.9

 

 J30.9

 

 J45.20

 

 Z12.31









 Office Visit  2017  2:15p  [facility]  Stacey Charles MD  [address]  R93.5









 R00.2

 

 R10.32









 Office Visit  2017  3:15p  [facility]  Stacey Charles MD  [address]  R73.01









 K58.9

 

 I10

 

 F41.9









 Office Visit  2017 11:30a  [facility]  [place]  [address]  R10.12

 

 Office Visit  2017  8:00a  [facility]  Satcey Charles MD  [address]  R31.9









 R10.9

 

 R73.01

 

 I10

 

 F41.9









 Office Visit  2017 11:15a  [facility]  Stacey Charles MD  [address]  R00.0









 I10

 

 K30

 

 R73.01

 

 F41.9









 Office Visit  2016  8:15a  [facility]  Stacey Charles MD  [address]  Z01.419









 F41.9

 

 J30.9

 

 R31.9

 

 J45.30

 

 Z12.31









 Office Visit  2015  8:15a  [facility]  Stacey Charles MD  [address]  V72.31









 V77.91

 

 300.00

 

 493.90

 

 477.9

 

 599.70

 

 V73.89

 

 V76.11







Plan of Care

Future Appointment(s):2018  8:50 am - Schedule, Laboratory at [facility]2018 10:15 am - Stacey Charles MD at [facility]10/2018  8:50 am - Schedule, 
Laboratory at [facility]2019  7:30 am - Schedule, Laboratory at [facility]2019  8:
15 am - Stacey Charles MD at [facility]2018 - Stacey Charles MDN18.3 Chronic 
kidney disease, stage 3 (moderate)New Medication:Ramipril 2.5 mgNew Labs:Mile 
Screen With Reflex-FCMGCRP (C-Reactive)Comprehensive Met Panel-FCMGComments:
answered questions about renal disease.  explained that at this point, the goal 
is to prevent damageto her kidneys.  she does not need a special diet, but 
would not recommend Atkins.    will start lowdose ramipril to protect her 
kidneys.Follow up:1 month follow-up ramipril start - check labs kwwunN81.01 
Impaired fasting glucoseComments:explained that controlling blood sugar is 
important for protecting the kidneys - medication not needed yet.L60.8 Other 
nail disordersNew Labs:EsrComments:check labs to evaluate for rheumatologic 
mofvlttmE82.2 CervicalgiaNew Xrays:Spine, Cervical, 2 Or 3 ViewsComments:check 
xray to evaluate for qnxkjpnqdF54.25 Body mass index (BMI) 25.0-25.9, adult

## 2018-08-09 NOTE — UC
Ear Complaint HPI





- HPI Summary


HPI Summary: 


5 day hx of progressive pain in the right ear and right side of the head and 

throat, throbbing and disrupting sleep. 


In May, had a cluster of crusts in the right side of scalp with simila r pain, 

less severe than current pain. 


No cough, fever, congestion. 


NO vertigo, headache; dental check within the past several months with good 

repair. 





- History of Current Complaint


Chief Complaint: UCGeneralIllness


Stated Complaint: RIGHT EAR PAIN


Time Seen by Provider: 08/09/18 20:48


Hx Obtained From: Patient


Hx Last Menstrual Period: n/a


Onset/Duration: Gradual Onset, Lasting Days - 5


Severity Initially: Moderate


Severity Currently: Severe


Pain Intensity: 8


Aggravating Factors: Nothing


Alleviating Factors: Nothing - has not used anlagesics.





- Allergies/Home Medications


Allergies/Adverse Reactions: 


 Allergies











Allergy/AdvReac Type Severity Reaction Status Date / Time


 


seasonal allergy Allergy  Eyes Uncoded 02/05/14 12:00





   Itchy/Swollen/Red/Watery  











Home Medications: 


 Home Medications





Fluticasone HFA 44 mcg(NF) [Flovent Hfa 44 mcg(NF)] 1 puff INH BEDTIME 08/09/18 

[History Confirmed 08/09/18]


Multivitamin with Minerals [One Daily Complete] 1 each PO DAILY 08/09/18 [

History Confirmed 08/09/18]


Ramipril CAP* [Altace CAP*] 2.5 mg PO DAILY 08/09/18 [History Confirmed 08/09/18

]











PMH/Surg Hx/FS Hx/Imm Hx





- Additional Past Medical History


Additional PMH: 





dx stage 3 renal disease last month and started on ramipril 





- Surgical History


Surgical History: Yes


Surgery Procedure, Year, and Place: tonsils as 18yr old; tubal ligation; right 

rotator cuff





- Family History


Known Family History: Positive: Other - no hx of neuralgia or neurologic 

disorders





- Social History


Occupation: Employed Full-time - 


Lives: With Family


Alcohol Use: Rare


Substance Use Type: None


Smoking Status (MU): Never Smoked Tobacco





Review of Systems


Constitutional: Fatigue


Skin: Negative


Eyes: Negative


ENT: Sore Throat, Ear Ache


Respiratory: Negative


Cardiovascular: Other - aware of elevated blood pressure.


Gastrointestinal: Negative


Genitourinary: Negative


Motor: Negative


Neurovascular: Negative


Musculoskeletal: Negative


Neurological: Negative


Psychological: Negative


Is Patient Immunocompromised?: No


All Other Systems Reviewed And Are Negative: Yes





Physical Exam


Triage Information Reviewed: Yes


Appearance: Well-Appearing, Pain Distress - moderate to severe.


Vital Signs: 


 Initial Vital Signs











Temp  98 F   08/09/18 20:45


 


Pulse  99   08/09/18 20:45


 


Resp  16   08/09/18 20:45


 


BP  141/78   08/09/18 20:45


 


Pulse Ox  100   08/09/18 20:45











Eye Exam: Other - no tearing.


Eyes: Positive: Conjunctiva Clear


ENT: Positive: Pharyngeal erythema - past tonsillectomy. + postnasal drainage., 

TM dull - right TM mildly retracted, no erythema


Dental Exam: Normal, Other - no percussive tenderness, good repair, healthy 

gums. No TMJ pain


Respiratory: Positive: Lungs clear, Normal breath sounds


Cardiovascular: Positive: RRR, No Murmur


Neurological Exam: Other - CNII-XII normal


Neurological: Positive: Alert


Psychological Exam: Normal


Skin Exam: Normal





Ear Complaint Course/Dx





- Course


Course Of Treatment: acetaminophen for pain, suspect trigeminal neuralgia





- Differential Dx/Diagnosis


Differential Diagnosis/HQI/PQRI: Otitis Media, Trigeminal Nueralgia, URI


Provider Diagnoses: suspect trigeminal neurlagia





Discharge





- Sign-Out/Discharge


Documenting (check all that apply): Patient Departure





- Discharge Plan


Condition: Stable


Disposition: HOME


Patient Education Materials:  Trigeminal Neuralgia (ED)


Referrals: 


Stacey Berry MD [Primary Care Provider] - 


Jennifer Ruth MD [Medical Doctor] - 


Additional Instructions: 


Please call for a neurology assessment, and dry to see Dr. Berry earlier than 

the 16th. Begin acetaminophen 1000mg every 8 hours for control of pain.


Dr. Ruth comes to [place] to do assessments: [phone] 





- Billing Disposition and Condition


Condition: STABLE


Disposition: Home